# Patient Record
Sex: FEMALE
[De-identification: names, ages, dates, MRNs, and addresses within clinical notes are randomized per-mention and may not be internally consistent; named-entity substitution may affect disease eponyms.]

---

## 2021-06-24 ENCOUNTER — HOSPITAL ENCOUNTER (EMERGENCY)
Dept: HOSPITAL 97 - ER | Age: 66
Discharge: HOME | End: 2021-06-24
Payer: COMMERCIAL

## 2021-06-24 ENCOUNTER — HOSPITAL ENCOUNTER (EMERGENCY)
Dept: HOSPITAL 97 - ER | Age: 66
Discharge: LEFT BEFORE DISCHARGE | End: 2021-06-24
Payer: COMMERCIAL

## 2021-06-24 VITALS — OXYGEN SATURATION: 99 % | SYSTOLIC BLOOD PRESSURE: 134 MMHG | TEMPERATURE: 97 F | DIASTOLIC BLOOD PRESSURE: 88 MMHG

## 2021-06-24 DIAGNOSIS — Z88.5: ICD-10-CM

## 2021-06-24 DIAGNOSIS — S62.512A: Primary | ICD-10-CM
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
## 2021-06-24 DIAGNOSIS — I10: ICD-10-CM

## 2021-06-24 DIAGNOSIS — Z02.9: Primary | ICD-10-CM

## 2021-06-24 PROCEDURE — 0JQK0ZZ REPAIR LEFT HAND SUBCUTANEOUS TISSUE AND FASCIA, OPEN APPROACH: ICD-10-PCS

## 2021-06-24 PROCEDURE — 2W3HX1Z IMMOBILIZATION OF LEFT THUMB USING SPLINT: ICD-10-PCS

## 2021-06-24 NOTE — XMS REPORT
Continuity of Care Document

                            Created on:2021



Patient:RYDER RENDON

Sex:Female

:1955

External Reference #:[id number]





Demographics







                          Address                   [address] [place] [address] [address]



                                                    [place] [address]

 

                          Home Phone                [phone]

 

                          Mobile Phone              [phone]

 

                          Email Address             [facility] [id number]

 

                          Preferred Language        en

 

                          Marital Status            Unknown

 

                          Gnosticist Affiliation     Unknown

 

                          Race                      Unknown

 

                          Additional Race(s)        Unavailable



                                                    White

 

                          Ethnic Group              Unknown









Author







                          Organization              [facility]

t

 

                          Address                   [address]



                                                    [place] [address]

 

                          Phone                     [phone]









Support







                Name            Relationship    Address         Phone

 

                Matt           Unavailable     [address] Unavailable



                                                [place] [address] 

 

                Huang       Unavailable     Unavailable     [phone]

 

                Matt           Child           Unavailable     [phone]









Care Team Providers







                    Name                Role                Phone

 

                    Shakir TELLO           Attending Clinician [phone]

 

                    Doctor Unassigned,  Name Attending Clinician Unavailable









Problems

This patient has no known problems.



Allergies, Adverse Reactions, Alerts







       Allergy Allergy Status Severity Reaction(s) Onset  Inactive Treating Comm

ents 

Source



       Name   Type                        Date   Date   Clinician        

 

       codeine Adverse Active        Swelling,                             [facility]

t



              Reaction               Nausea                             [facility]



                                                                      [facility]



                                                                      l



                                                                      Outpati



                                                                      ent



                                                                      Clinics







Medications







       Ordered Filled Start  Stop   Current Ordering Indication Dosage Frequency

 Signature

                    Comments            Components          Source



     Medication Medication Date Date Medication? Clinician                (SIG) 

          



     Name Name                                                   

 

     Levothyroxi Levothyroxi 2019      Yes  Gerry                1 tablet    

       [facility]



     ne Sodium ne Sodium 2-18           Nye                on an           [facility]

es -



               00:00:                               empty           [facility]



               00                                 stomach in           l



                                                  the            Outpati



                                                  morning           ent



                                                                 Clinics

 

     Ibuprofen Ibuprofen           Yes  Gerry                1 tablet          

 [facility]



                              Nye                with food           [facility]



                                                  or milk as           [facility]



                                                  needed           l



                                                                 Outpati



                                                                 ent



                                                                 Clinics

 

     Levothyroxi Levothyroxi           Yes  Gerry                1 tablet      

     [facility]



     ne Sodium ne Sodium                Nye                on an           [facility]

es -



                                                  empty           [facility]



                                                  stomach in           l



                                                  the            Outpati



                                                  morning           ent



                                                                 Clinics

 

     Levothyroxi Levothyroxi           Yes  Gerry                1 tablet      

     [facility]



     ne Sodium ne Sodium                Nye                on an           [facility]

es -



                                                  empty           [facility]



                                                  stomach in           l



                                                  the            Outpati



                                                  morning           ent



                                                                 Clinics

 

     Omeprazole Omeprazole           Yes  Gerry                1 capsule       

    [facility]



                              Nye                               [facility]



                                                                 [facility]



                                                                 l



                                                                 Outpati



                                                                 ent



                                                                 Clinics

 

     Ibuprofen Ibuprofen           Yes  Gerry                1 tablet          

 [facility]



                              Nye                with food           [facility]



                                                  or milk as           [facility]



                                                  needed           l



                                                                 Outpati



                                                                 ent



                                                                 Clinics

 

     Amlodipine Amlodipine           Yes  Gerry                1 tablet        

   [facility]



     Besylate Besylate                Nye                               [facility] 

-



                                                                 [facility]



                                                                 l



                                                                 Outpati



                                                                 ent



                                                                 Clinics

 

     Clonidine Clonidine           Yes  Gerry                1 tablet          

 [facility]



     HCl  HCl                 Nye                               [facility]



                                                                 [facility]



                                                                 l



                                                                 Outpati



                                                                 ent



                                                                 Clinics

 

     Lisinopril- Lisinopril-           Yes  Gerry                1 tablet      

     [facility]



     Hydrochloro Hydrochloro                Nye                               

[facility]



     thiazide thiazide                                                   [facility]



                                                                 l



                                                                 Outpati



                                                                 ent



                                                                 Clinics







Procedures

This patient has no known procedures.



Encounters







        Start   End     Encounter Admission Attending Care    Care    Encounter 

Source



        Date/Time Date/Time Type    Type    Clinicians Facility Department ID   

   

 

        2021 Outpatient                 [facility]  [id number]

 [facility]



        00:00[facility]00 00[facility]00[facility]00                                                 [facility]



                                                                        [facility]



                                                                        l



                                                                        Outpati



                                                                        ent



                                                                        Clinics

 

        2020 Outpatient                 [facility]

[id number] [facility]



        16:11:00 16:11:00                         [place]   [facility]                 [facility]



                                                                        ent



                                                                        [facility]

 

        2020 Emergency         [place], [facility]    [id number]

[id number] 



        13:28:43 15:02:00                 [place] [id number]         



                                                [place] [id number]         



                                                [place]  [phone]         



                                                        084             

 

        2020 Orders          Doctor SIMMONS    [id number]

15 



        00:00:00 00:00:00 Only            Unassigned, JAYDEN   [id number]       

  



                                        [place] [id number]         



                                                        [phone]         



                                                        009             

 

        2019 Outpatient                 [facility]

[id number] [facility]



        11:56:00 11:56:00                         [place]   [facility]



                                                Medicine                 [facility]



                                                                        [facility]



                                                                        Clinics

 

        2019-07-10 2019-07-10 Outpatient                 [facility]

[id number] [facility]



        08:00:00 08:00:00                         [place]   [facility]



                                                Medicine                 [facility]



                                                                        [facility]



                                                                        Clinics

 

        2019-04-10 2019-04-10 Outpatient                 [facility]

[id number] [facility]



        14:32:00 14:32:00                         [place]   [facility]



                                                Medicine                 Outpati



                                                                        ent



                                                                        Clinics

 

        2019-04-10 2019-04-10 Outpatient                 [facility]

[id number] [facility]



        08:45:00 08:45:00                         [place]   [employer]

s -



                                                [employer]   [facility]



                                                Medicine                 Outpati



                                                                        ent



                                                                        Clinics

 

        2019 Outpatient                 [facility]

[id number] [facility]



        12:47:00 12:47:00                         [place]   [employer]

s -



                                                [employer]   [facility]



                                                Medicine                 Outpati



                                                                        ent



                                                                        Clinics

 

        2019-01-10 2019-01-10 Outpatient                 [facility]

[id number] [facility]



        08:30:00 08:30:00                         [place]   [employer]

s -



                                                [employer]   [facility]



                                                Medicine                 Outpati



                                                                        ent



                                                                        Clinics

 

        2019 Outpatient                 [facility]

[id number] [facility]



        16:34:00 16:34:00                         t [employer]

s -



                                                [employer]   [facility]



                                                Medicine                 Outpati



                                                                        ent



                                                                        Clinics

 

        2018-10-11 2018-10-11 Outpatient                 [facility]

[id number] [facility]



        09:45:00 09:45:00                         [place]   [employer]

s -



                                                [employer]   [facility]



                                                Medicine                 Outpati



                                                                        ent



                                                                        Clinics

 

        2018 Outpatient                 [facility]

[id number] [facility]



        09:15:00 09:15:00                         t [employer]

s -



                                                [employer]   [facility]



                                                Medicine                 Outpati



                                                                        ent



                                                                        Clinics







Results

This patient has no known results.

## 2021-06-24 NOTE — EDPHYS
Physician Documentation                                                                           

 [facility]                                                                 

Name: Tara Gutierrez                                                                                

Age: 66 yrs                                                                                       

Sex: Female                                                                                       

: 1955                                                                                   

MRN: [id number]                                                                                   

Arrival Date: 2021                                                                          

Time: 21:12                                                                                       

Account#: [id number]                                                                            

Bed 17                                                                                            

Private MD:                                                                                       

ED Physician Maxwell Flores                                                                      

HPI:                                                                                              

                                                                                             

22:59 This 66 yrs old  Female presents to ER via Ambulatory with complaints of Thumb  jr8 

      Injury.                                                                                     

22:59 The complaints affect the left thumb. Onset: The symptoms/episode began/occurred        jr8 

      acutely, today. Modifying factors: The symptoms are alleviated by nothing, the symptoms     

      are aggravated by movement. Associated signs and symptoms: Pertinent positives:             

      laceration . Severity of symptoms: At their worst the symptoms were moderate, in the        

      emergency department the symptoms are unchanged. The patient has not experienced            

      similar symptoms in the past. The patient has not recently seen a physician. Patient        

      accidently slammed thumb into car trunk causing laceration, swelling, tenderness, and       

      decreased ROM .                                                                             

                                                                                                  

Historical:                                                                                       

- Allergies:                                                                                      

21:35 Codeine;                                                                                em  

- PMHx:                                                                                           

21:35 Arthritis; Hypothyroidism; Hypertension;                                                em  

- PSHx:                                                                                           

21:35 Appendectomy; Tonsillectomy; Cholecystectomy; Tubal ligation;                           em  

                                                                                                  

- Immunization history:: Adult Immunizations up to date.                                          

- Social history:: Smoking status: Patient denies any tobacco usage or history of.                

                                                                                                  

                                                                                                  

ROS:                                                                                              

22:59 Eyes: Negative for injury, pain, redness, and discharge, ENT: Negative for injury,      jr8 

      pain, and discharge, Neck: Negative for injury, pain, and swelling, Cardiovascular:         

      Negative for chest pain, palpitations, and edema, Respiratory: Negative for shortness       

      of breath, cough, wheezing, and pleuritic chest pain, Abdomen/GI: Negative for              

      abdominal pain, nausea, vomiting, diarrhea, and constipation, Back: Negative for injury     

      and pain, Neuro: Negative for headache, weakness, numbness, tingling, and seizure.          

22:59 MS/extremity: Positive for ecchymosis, laceration, pain, swelling, tenderness, of the       

      left thumb.                                                                                 

22:59 Skin: Positive for laceration(s).                                                           

                                                                                                  

Exam:                                                                                             

22:59 Constitutional:  This is a well developed, well nourished patient who is awake, alert,  jr8 

      and in no acute distress. Cardiovascular:  Regular rate and rhythm with a normal S1 and     

      S2.  No gallops, murmurs, or rubs.  Normal PMI, no JVD.  No pulse deficits.                 

      Respiratory:  Lungs have equal breath sounds bilaterally, clear to auscultation and         

      percussion.  No rales, rhonchi or wheezes noted.  No increased work of breathing, no        

      retractions or nasal flaring. Skin:  Warm, dry with normal turgor.  Normal color with       

      no rashes, no lesions, and no evidence of cellulitis. Neuro:  Awake and alert, GCS 15,      

      oriented to person, place, time, and situation.   Motor strength 5/5 in all                 

      extremities.  Sensory grossly intact.                                                       

22:59 Musculoskeletal/extremity: Extremities: grossly normal except: noted in the left thumb:     

      moderate swelling noted from MCP to DIP of left thumb. 2.5 cm laceration noted to           

      dorsal thumb, ROM: limited active range of motion, limited passive range of motion,         

      limited active range of motion due to pain, limited passive range of motion due to          

      pain, Circulation is intact in all extremities. Sensation intact.                           

                                                                                                  

Vital Signs:                                                                                      

21:32  / 88; Pulse 106; Resp 20; Temp 97.0; Pulse Ox 99% on R/A; Weight 92.08 kg;       em  

      Height 5 ft. 2 in. (157.48 cm); Pain 8/10;                                                  

21:32 Body Mass Index 37.13 (92.08 kg, 157.48 cm)                                             em  

                                                                                                  

Procedures:                                                                                       

22:59 Splinting: Splint applied to left hand using Orthoglass splint, applied by tech. nurse. jr8 

      Examined by me, post splint application: neurovascular intact, 2+ distal pulses             

      palpable, brisk capillary refill noted, Patient tolerated well.                             

                                                                                                  

Laceration:                                                                                       

22:59 Wound Repair of 2.5cm ( 1.0in ) subcutaneous laceration to dorsal aspect of proximal    jr8 

      phalanx of left thumb. Linear shaped.. Distal neuro/vascular/tendon intact. Anesthesia:     

      Local anesthetic administered with 2 mls of 1% lidocaine. Wound prep: Moderate              

      cleansing with hibiclenz, Wound irrigation with saline, Wound explored extensively.         

      Skin closed with 2 4-0 Prolene using interrupted sutures and sterile technique. Patient     

      tolerated well.                                                                             

                                                                                                  

MDM:                                                                                              

21:38 Patient medically screened.                                                             jr8 

22:59 Data reviewed: vital signs, nurses notes, radiologic studies, plain films, and as a     jr8 

      result, I will discharge patient. Data interpreted: Pulse oximetry: on room air is 99       

      %. Interpretation: normal. Counseling: I had a detailed discussion with the patient         

      and/or guardian regarding: the historical points, exam findings, and any diagnostic         

      results supporting the discharge/admit diagnosis, radiology results, the need for           

      outpatient follow up, a hand specialist, to return to the emergency department if           

      symptoms worsen or persist or if there are any questions or concerns that arise at home.    

23:06 ED course: Patient declined tetanus shot.                                               jr8 

                                                                                                  

                                                                                             

21:38 Order name: Hand Left 3 View XRAY                                                       em  

                                                                                             

23:06 Order name: Thumb Spica Splint; Complete Time: 23:17                                    jr8 

                                                                                                  

Administered Medications:                                                                         

22:40 Drug: Lidocaine (1 %) 1 application {Note: by George RUIZ.} Volume: 5 ml; Route:            ca1 

      Infiltration;                                                                               

                                                                                                  

                                                                                                  

Disposition:                                                                                      

                                                                                             

07:13 Co-signature as Attending Physician, Maxwell Flores MD I agree with the assessment and  bernie 

      plan of care.                                                                               

                                                                                                  

Disposition:                                                                                      

21 23:06 Discharged to Home. Impression: Laceration without foreign body of thumb           

  without damage to nail, Fracture of proximal phalanx of thumb.                                  

- Condition is Stable.                                                                            

- Discharge Instructions: Laceration Care, Adult, Thumb Fracture.                                 

                                                                                                  

- Medication Reconciliation Form, Thank You Letter, Antibiotic Education, Prescription            

  Opioid Use form.                                                                                

- Follow up: Salvador Obrien MD; When: 2 - 3 days; Reason: Recheck today's complaints,           

  Continuance of care, Re-evaluation by your physician.                                           

- Problem is new.                                                                                 

- Symptoms have improved.                                                                         

                                                                                                  

                                                                                                  

                                                                                                  

Signatures:                                                                                       

Dispatcher [employer]                           Maxwell Carrillo MD MD cha Munoz, Edgar, RN                        RN                                                      

George Acharya PA PA   jr8                                                  

Cl Escamilla RN RN   jb4                                                  

Татьняа Walker RN                        RN   ca1                                                  

                                                                                                  

Corrections: (The following items were deleted from the chart)                                    

                                                                                             

23:17 23:06 2021 23:06 Discharged to Home. Impression: Laceration without foreign body  em  

      of thumb without damage to nail; Fracture of proximal phalanx of thumb. Condition is        

      Stable. Forms are Medication Reconciliation Form, Thank You Letter, Antibiotic              

      Education, Prescription Opioid Use. Follow up: Salvador Obrien; When: 2 - 3 days;            

      Reason: Recheck today's complaints, Continuance of care, Re-evaluation by your              

      physician. Problem is new. Symptoms have improved. jr8                                      

                                                                                                  

**************************************************************************************************

## 2021-06-24 NOTE — ER
Nurse's Notes                                                                                     

 [facility]                                                                 

Name: Tara Gutierrez                                                                                

Age: 66 yrs                                                                                       

Sex: Female                                                                                       

: 1955                                                                                   

MRN: [id number]                                                                                   

Arrival Date: 2021                                                                          

Time: 21:12                                                                                       

Account#: [id number]                                                                            

Bed 17                                                                                            

Private MD:                                                                                       

Diagnosis: Laceration without foreign body of thumb without damage to nail;Fracture of proximal   

  phalanx of thumb                                                                                

                                                                                                  

Presentation:                                                                                     

                                                                                             

21:32 Chief complaint: Patient states: smashed left thumb in car door, takes 800 mg ibuprofen em  

      for pain due to arthritis. Coronavirus screen: Client denies travel out of the [place] in      

      the last 14 days. Ebola Screen: Patient negative for fever greater than or equal to         

      101.5 degrees Fahrenheit, and additional compatible Ebola Virus Disease symptoms            

      Patient denies exposure to infectious person. Patient denies travel to an                   

      Ebola-affected area in the 21 days before illness onset. No symptoms or risks               

      identified at this time. Initial Sepsis Screen: Does the patient meet any 2 criteria?       

      HR > 90 bpm. No. Patient's initial sepsis screen is negative. Does the patient have a       

      suspected source of infection? No. Patient's initial sepsis screen is negative. Risk        

      Assessment: Do you want to hurt yourself or someone else? Patient reports no desire to      

      harm self or others. Onset of symptoms was 2021.                                   

21:32 Method Of Arrival: Ambulatory                                                           em  

21:32 Acuity: ELLI 4                                                                           em  

                                                                                                  

Triage Assessment:                                                                                

22:01 General: . Injury Description:.                                                         ca1 

                                                                                                  

Historical:                                                                                       

- Allergies:                                                                                      

21:35 Codeine;                                                                                em  

- PMHx:                                                                                           

21:35 Arthritis; Hypothyroidism; Hypertension;                                                em  

- PSHx:                                                                                           

21:35 Appendectomy; Tonsillectomy; Cholecystectomy; Tubal ligation;                           em  

                                                                                                  

- Immunization history:: Adult Immunizations up to date.                                          

- Social history:: Smoking status: Patient denies any tobacco usage or history of.                

                                                                                                  

                                                                                                  

Screenin:45 Abuse screen: Denies threats or abuse. Denies injuries from another. Nutritional        ca1 

      screening: No deficits noted. Tuberculosis screening: No symptoms or risk factors           

      identified. Fall Risk None identified.                                                      

                                                                                                  

Assessment:                                                                                       

20:45 General: Appears in no apparent distress. comfortable, Behavior is calm, cooperative,   ca1 

      appropriate for age. Pain: Complains of pain in dorsal aspect of distal phalanx of left     

      thumb, dorsal aspect of proximal phalanx of left thumb, palmar aspect of distal phalanx     

      of left thumb, palmar aspect of proximal phalanx of left thumb and left thumbnail.          

      Pain: Pain currently is 6 out of 10 on a pain scale. Neuro: Level of Consciousness is       

      awake, alert, obeys commands, Oriented to person, place, time, situation. Derm: Skin is     

      healthy with good turgor, Skin is pink, warm \T\ dry. Musculoskeletal: Circulation,         

      motion, and sensation intact. Capillary refill < 3 seconds.                                 

20:45 Injury Description: Laceration sustained to dorsal aspect of proximal phalanx of left   ca1 

      thumb.                                                                                      

21:40 Reassessment: Patient appears in no apparent distress at this time. Patient is alert,   ca1 

      oriented x 3, equal unlabored respirations, skin warm/dry/pink.                             

22:51 Reassessment: Patient appears in no apparent distress at this time. Patient is alert,   ca1 

      oriented x 3, equal unlabored respirations, skin warm/dry/pink. pt Tetanus Shot not up      

      to date. Pt refuse and states, "I don't want it. I got antibiotics" Notified provider.      

                                                                                                  

Vital Signs:                                                                                      

21:32  / 88; Pulse 106; Resp 20; Temp 97.0; Pulse Ox 99% on R/A; Weight 92.08 kg;       em  

      Height 5 ft. 2 in. (157.48 cm); Pain 8/10;                                                  

21:32 Body Mass Index 37.13 (92.08 kg, 157.48 cm)                                             em  

                                                                                                  

ED Course:                                                                                        

20:45 Patient has correct armband on for positive identification. Bed in low position. Call   ca1 

      light in reach. Side rails up X 1. Pulse ox on. NIBP on. Warm blanket given.                

21:12 Patient arrived in ED.                                                                  es  

21:34 Triage completed.                                                                       em  

21:35 Arm band placed on.                                                                     em  

21:38 George Acharya PA is PHCP.                                                               [id number] 

21:38 Maxwell Flores MD is Attending Physician.                                             [id number] 

21:59 Татьяна Walker, RN is Primary Nurse.                                                      ca1 

22:15 Hand Left 3 View XRAY In Process Unspecified.                                           EDMS

23:05 Salvador Obrien MD is Referral Physician.                                              [id number] 

23:17 Orthoglass splint: Thumb spica splint applied on left forearm.                          em  

23:17 No provider procedures requiring assistance completed. Patient did not have IV access   em  

      during this emergency room visit.                                                           

                                                                                                  

Administered Medications:                                                                         

22:40 Drug: Lidocaine (1 %) 1 application {Note: by George RUIZ.} Volume: 5 ml; Route:            ca1 

      Infiltration;                                                                               

                                                                                                  

                                                                                                  

Outcome:                                                                                          

23:06 Discharge ordered by MD. chang 

23:17 Discharged to home ambulatory.                                                          em  

23:17 Condition: good                                                                             

23:17 Discharge instructions given to patient, Instructed on discharge instructions, follow       

      up and referral plans. wound care, Demonstrated understanding of instructions,              

      follow-up care, wound care, splint care.                                                    

23:17 Patient left the ED.                                                                    em  

                                                                                                  

Signatures:                                                                                       

Dispatcher [employer]                           EDMS                                                 

Radha Davila Edgar RN                        RN                                                      

George Acharya PA PA jr8 Acob, Cheryl RN                        RN   ca1                                                  

                                                                                                  

Corrections: (The following items were deleted from the chart)                                    

: 20:45 Derm: Skin is intact, is healthy with good turgor, Skin is pink, warm \T\ dry. ca1  ca1

 22:51 Reassessment: Patient appears in no apparent distress at this time. Patient is    ca1 

      alert, oriented x 3, equal unlabored respirations, skin warm/dry/pink. pt Tetanus Shot      

      not up to date. Pt refuse and states, "I don't want it. I got antibiotics" ca1              

 22:51 Injury Description: Laceration sustained to dorsal aspect of proximal phalanx of  ca1 

      left thumb ca1                                                                              

                                                                                                  

**************************************************************************************************

## 2021-06-24 NOTE — ER
Nurse's Notes                                                                                     

 [facility]                                                                 

Name: Tara Gutierrez                                                                                

Age: 66 yrs                                                                                       

Sex: Female                                                                                       

: 1955                                                                                   

MRN: [id number]                                                                                   

Arrival Date: 2021                                                                          

Time: 19:54                                                                                       

Account#: [id number]                                                                            

Bed 26                                                                                            

Private MD:                                                                                       

Diagnosis:                                                                                        

                                                                                                  

Presentation:                                                                                     

                                                                                             

20:01 Chief complaint: Patient states: laceration and swelling to L thumb that occurred after ss  

      smashing it in a car 1 hour ago. Pt is concerned because the wound is still bleeding.       

      Oozing noted during triage. Clean, pressure dressing placed to affected area.               

      Coronavirus screen: Client denies travel out of the [place] in the last 14 days. Ebola         

      Screen: Patient denies exposure to infectious person. Patient denies travel to an           

      Ebola-affected area in the 21 days before illness onset. Initial Sepsis Screen: Does        

      the patient meet any 2 criteria? No. Patient's initial sepsis screen is negative. Does      

      the patient have a suspected source of infection? No. Patient's initial sepsis screen       

      is negative. Risk Assessment: Do you want to hurt yourself or someone else? Patient         

      reports no desire to harm self or others. Onset of symptoms was 2021.              

20:01 Method Of Arrival: Ambulatory                                                           ss  

20:01 Acuity: ELLI 4                                                                           ss  

                                                                                                  

Historical:                                                                                       

- Allergies:                                                                                      

20:03 No Known Allergies;                                                                     ss  

                                                                                                  

- Immunization history:: Adult Immunizations up to date, Last tetanus immunization:               

  unknown, Pt is refusing tetanus immunization prior to being seen by provider. .                 

- Social history:: Smoking status: Patient denies any tobacco usage or history of.                

                                                                                                  

                                                                                                  

Assessment:                                                                                       

20:13 Reassessment: Pt had family emergency just after being brought back to exam room. AMA   em  

      form signed.                                                                                

                                                                                                  

Vital Signs:                                                                                      

20:01  / 73; Pulse 82; Resp 16; Temp 98.7(TE); Pulse Ox 99% on R/A; Weight 92.08 kg;    ss  

      Height 5 ft. 2 in. (157.48 cm); Pain 8/10;                                                  

20:01 Body Mass Index 37.13 (92.08 kg, 157.48 cm)                                               

                                                                                                  

ED Course:                                                                                        

19:54 Patient arrived in ED.                                                                  es  

20:03 Triage completed.                                                                       ss  

20:03 Arm band placed on right wrist.                                                         ss  

20:12 No provider procedures requiring assistance completed. Patient did not have IV access   em  

      during this emergency room visit.                                                           

                                                                                                  

Administered Medications:                                                                         

No medications were administered                                                                  

                                                                                                  

                                                                                                  

Outcome:                                                                                          

20:12 AMA AMA form signed                                                                     em  

20:12 Condition: stable                                                                           

20:12 Instructed on follow up and referral plans.                                                 

20:14 Patient left the ED.                                                                    em  

                                                                                                  

Signatures:                                                                                       

Radha Davila Edgar, RN                        RN   em                                                   

Nanci Lafleur RN                      RN   ss                                                   

                                                                                                  

**************************************************************************************************

## 2021-06-25 NOTE — RAD REPORT
EXAM DESCRIPTION:  RAD -Hand Left 3 View - 6/24/2021 10:15 pm

 

CLINICAL HISTORY:

Left hand pain status post injury

 

FINDINGS:  Mildly to moderately displaced fracture involves the proximal to mid aspect of the first p
roximal phalanx.

 

No dislocation